# Patient Record
Sex: MALE | Race: WHITE | NOT HISPANIC OR LATINO | Employment: OTHER | URBAN - METROPOLITAN AREA
[De-identification: names, ages, dates, MRNs, and addresses within clinical notes are randomized per-mention and may not be internally consistent; named-entity substitution may affect disease eponyms.]

---

## 2023-11-15 ENCOUNTER — APPOINTMENT (OUTPATIENT)
Dept: RADIOLOGY | Facility: MEDICAL CENTER | Age: 43
End: 2023-11-15
Attending: NEUROLOGICAL SURGERY
Payer: COMMERCIAL

## 2023-11-15 ENCOUNTER — HOSPITAL ENCOUNTER (OUTPATIENT)
Facility: MEDICAL CENTER | Age: 43
End: 2023-11-15
Attending: NEUROLOGICAL SURGERY | Admitting: NEUROLOGICAL SURGERY
Payer: COMMERCIAL

## 2023-11-15 ENCOUNTER — HOSPITAL ENCOUNTER (OUTPATIENT)
Dept: RADIOLOGY | Facility: MEDICAL CENTER | Age: 43
End: 2023-11-15

## 2023-11-15 VITALS
HEIGHT: 68 IN | OXYGEN SATURATION: 95 % | DIASTOLIC BLOOD PRESSURE: 82 MMHG | WEIGHT: 232.81 LBS | HEART RATE: 71 BPM | RESPIRATION RATE: 16 BRPM | BODY MASS INDEX: 35.28 KG/M2 | TEMPERATURE: 98.1 F | SYSTOLIC BLOOD PRESSURE: 132 MMHG

## 2023-11-15 DIAGNOSIS — M54.40 LOW BACK PAIN WITH SCIATICA, SCIATICA LATERALITY UNSPECIFIED, UNSPECIFIED BACK PAIN LATERALITY, UNSPECIFIED CHRONICITY: ICD-10-CM

## 2023-11-15 LAB
ERYTHROCYTE [DISTWIDTH] IN BLOOD BY AUTOMATED COUNT: 39.7 FL (ref 35.9–50)
HCT VFR BLD AUTO: 43.2 % (ref 42–52)
HGB BLD-MCNC: 14.1 G/DL (ref 14–18)
INR PPP: 1.06 (ref 0.87–1.13)
MCH RBC QN AUTO: 28.3 PG (ref 27–33)
MCHC RBC AUTO-ENTMCNC: 32.6 G/DL (ref 32.3–36.5)
MCV RBC AUTO: 86.7 FL (ref 81.4–97.8)
PLATELET # BLD AUTO: 272 K/UL (ref 164–446)
PMV BLD AUTO: 8.8 FL (ref 9–12.9)
PROTHROMBIN TIME: 14 SEC (ref 12–14.6)
RBC # BLD AUTO: 4.98 M/UL (ref 4.7–6.1)
WBC # BLD AUTO: 11.2 K/UL (ref 4.8–10.8)

## 2023-11-15 PROCEDURE — 160002 HCHG RECOVERY MINUTES (STAT)

## 2023-11-15 PROCEDURE — 85027 COMPLETE CBC AUTOMATED: CPT

## 2023-11-15 PROCEDURE — 85610 PROTHROMBIN TIME: CPT

## 2023-11-15 PROCEDURE — 700117 HCHG RX CONTRAST REV CODE 255: Performed by: NEUROLOGICAL SURGERY

## 2023-11-15 PROCEDURE — 72110 X-RAY EXAM L-2 SPINE 4/>VWS: CPT

## 2023-11-15 PROCEDURE — 62284 INJECTION FOR MYELOGRAM: CPT

## 2023-11-15 PROCEDURE — 160047 HCHG PACU  - EA ADDL 30 MINS PHASE II

## 2023-11-15 PROCEDURE — 72132 CT LUMBAR SPINE W/DYE: CPT

## 2023-11-15 PROCEDURE — 160046 HCHG PACU - 1ST 60 MINS PHASE II

## 2023-11-15 RX ORDER — VITAMIN B COMPLEX
2000 TABLET ORAL DAILY
COMMUNITY

## 2023-11-15 RX ORDER — OMEPRAZOLE 20 MG/1
20 CAPSULE, DELAYED RELEASE ORAL DAILY
COMMUNITY

## 2023-11-15 RX ORDER — CETIRIZINE HYDROCHLORIDE 10 MG/1
10 TABLET ORAL DAILY
COMMUNITY

## 2023-11-15 RX ORDER — OXYCODONE HYDROCHLORIDE 5 MG/1
5 TABLET ORAL
Status: DISCONTINUED | OUTPATIENT
Start: 2023-11-15 | End: 2023-11-15 | Stop reason: HOSPADM

## 2023-11-15 RX ORDER — CHOLECALCIFEROL (VITAMIN D3) 125 MCG
1000 CAPSULE ORAL DAILY
COMMUNITY

## 2023-11-15 RX ORDER — OXYCODONE HYDROCHLORIDE 5 MG/1
10 TABLET ORAL
Status: DISCONTINUED | OUTPATIENT
Start: 2023-11-15 | End: 2023-11-15 | Stop reason: HOSPADM

## 2023-11-15 RX ORDER — NAPROXEN 500 MG/1
500 TABLET ORAL PRN
COMMUNITY

## 2023-11-15 RX ORDER — LOPERAMIDE HYDROCHLORIDE 2 MG/1
2 CAPSULE ORAL DAILY
COMMUNITY

## 2023-11-15 RX ORDER — ASCORBIC ACID 500 MG
500 TABLET ORAL DAILY
COMMUNITY

## 2023-11-15 RX ORDER — M-VIT,TX,IRON,MINS/CALC/FOLIC 27MG-0.4MG
1 TABLET ORAL DAILY
COMMUNITY

## 2023-11-15 RX ORDER — GLUCOSAMINE/CHONDR SU A SOD 750-600 MG
1 TABLET ORAL DAILY
COMMUNITY

## 2023-11-15 RX ORDER — IBUPROFEN 200 MG
200 TABLET ORAL EVERY 6 HOURS PRN
COMMUNITY

## 2023-11-15 RX ADMIN — IOHEXOL 10 ML: 180 INJECTION INTRAVENOUS at 12:00

## 2023-11-15 ASSESSMENT — PAIN DESCRIPTION - PAIN TYPE: TYPE: SURGICAL PAIN

## 2023-11-15 NOTE — DISCHARGE INSTRUCTIONS
Myelogram  A myelogram is an imaging study of the spinal cord and the places where nerves attach to the spinal cord (nerve roots). A dye (contrast material) is injected into the spine before the X-ray. This provides a clearer image for your health care provider to see.  You may need this study done if you have a spinal cord problem that cannot be diagnosed with other imaging studies, such as a CT scan or an MRI. You may also have this study to check your spine after surgery.  Tell a health care provider about:  Any allergies you have, especially to iodine.  All medicines you are taking, including vitamins, herbs, eye drops, creams, and over-the-counter medicines.  Any problems you or family members have had with anesthetic medicines or contrast material.  Any blood disorders you have.  Any surgeries you have had.  Any medical conditions you have or have had, including asthma.  Whether you are pregnant or may be pregnant.  What are the risks?  Generally, this is a safe procedure. However, problems may occur, including:  Infection.  Bleeding.  Allergic reaction to medicines or dyes.  Damage to your spinal cord or nerves.  Loss or leaking of spinal fluid. This can lead to headaches.  Damage to kidneys.  Seizures. This is rare.  What happens before the procedure?  Follow instructions from your health care provider about eating or drinking restrictions. You may be asked to drink more fluids.  Ask your health care provider about changing or stopping your regular medicines. This is especially important if you are taking diabetes medicines or blood thinners.  Plan to have someone take you home from the hospital or clinic.  If you will be going home right after the procedure, plan to have someone with you for 24 hours.  What happens during the procedure?  You will lie face down on a table.  Your health care provider will locate the best injection site on your spine. This is most often in the lower back.  The area of  injection will be washed with soap.  You will be given a medicine to numb the area (local anesthetic).  Your health care provider will insert a long needle into the space around your spinal cord (subarachnoid space).  A sample of spinal fluid may be taken and sent to the lab for testing.  The contrast material will be injected into the subarachnoid space.  The exam table may be tilted to help the contrast material flow up or down your spine.  The X-ray will take images of your spinal cord for your health care provider to examine.  A bandage (dressing) may be placed over the injection site.  The procedure may vary among health care providers and hospitals.  What can I expect after this procedure?  Your blood pressure, heart rate, breathing rate, and blood oxygen level may be monitored until you leave the hospital or clinic.  You may have:  Soreness on your injection site.  A mild headache.  You will be asked to lie down with your head raised (elevated). This reduces the risk of a headache.  It is up to you to get the results of your procedure. Ask your health care provider, or the department that is doing the procedure, when your results will be ready.  Follow these instructions at home:  Rest as told by your health care provider. Lie flat with your head slightly elevated to reduce the risk of a headache.  Do not bend, lift, or do hard work for 24-48 hours, or as told by your health care provider.  Take over-the-counter and prescription medicines only as told by your health care provider.  Take care of your dressing as told by your health care provider.  Drink enough fluid to keep your urine pale yellow.  Bathe or shower as told by your health care provider.  Contact a health care provider if:  You have a fever.  You have a headache that lasts longer than 24 hours.  You feel nauseous or vomit.  You have a stiff neck or numbness in your legs.  You are unable to urinate or have a bowel movement.  You develop a rash,  itching, or sneezing.  Get help right away if:  You have new symptoms or your symptoms get worse.  You have a seizure.  You have trouble breathing.  Summary  A myelogram is an imaging study of the spinal cord and the places where nerves attach to the spinal cord (nerve roots).  Before the procedure, follow instructions from your health care provider about changing or stopping your regular medicines, and eating and drinking restrictions.  After this procedure, you will be asked to lie down with your head raised (elevated). This reduces your risk of a headache.  Do not bend, lift, or do hard work for 24-48 hours, or as told by your health care provider.  Contact a health care provider if you have a stiff neck or numbness in your legs. Get help right away if symptoms get worse, or you have a seizure or trouble breathing.  This information is not intended to replace advice given to you by your health care provider. Make sure you discuss any questions you have with your health care provider.  Document Revised: 04/11/2023 Document Reviewed: 02/27/2020  Elsevier Patient Education © 2023 [employer]

## 2023-11-15 NOTE — OR NURSING
Patient called to find out about preadmit instructions for tomorrow surgery. He was very frustrated with me that I didn't have the check in time or that I couldn't describe the procedure and that someone said it was a surgery instead. He demanded that someone that know what is going on to call him. Did not allow me to go any other question or interactions pretending to tomorrow procedure. Patient stated that he doesn't care about eating or not eating the day of procedure.   I sent a team message to Nicolasa Aquino . This phone call was past 430 pm.

## 2023-11-15 NOTE — PROGRESS NOTES
1125 pt in phase 2, vss, band aide in place CDI, pt denies pain/nausea. Pt aware of bedrest until 1300.    1135 pt wife updated, will meet at [facility] 1300    1210 pt reports needs to void, but unable to laying down. Dr Hoover aware and ok for pt to sit up only to void.     1300 pt offers no complaints, vss, has all belongings, safe to dc home

## 2024-03-08 ENCOUNTER — APPOINTMENT (OUTPATIENT)
Dept: RADIOLOGY | Facility: MEDICAL CENTER | Age: 44
End: 2024-03-08
Attending: STUDENT IN AN ORGANIZED HEALTH CARE EDUCATION/TRAINING PROGRAM
Payer: COMMERCIAL

## 2024-03-11 ENCOUNTER — APPOINTMENT (OUTPATIENT)
Dept: RADIOLOGY | Facility: MEDICAL CENTER | Age: 44
End: 2024-03-11
Attending: STUDENT IN AN ORGANIZED HEALTH CARE EDUCATION/TRAINING PROGRAM
Payer: COMMERCIAL

## 2024-03-11 DIAGNOSIS — M54.50 LOW BACK PAIN, UNSPECIFIED BACK PAIN LATERALITY, UNSPECIFIED CHRONICITY, UNSPECIFIED WHETHER SCIATICA PRESENT: ICD-10-CM

## 2024-03-11 PROCEDURE — 72110 X-RAY EXAM L-2 SPINE 4/>VWS: CPT

## 2024-08-27 ENCOUNTER — APPOINTMENT (OUTPATIENT)
Dept: RADIOLOGY | Facility: MEDICAL CENTER | Age: 44
End: 2024-08-27
Attending: STUDENT IN AN ORGANIZED HEALTH CARE EDUCATION/TRAINING PROGRAM
Payer: COMMERCIAL

## 2024-08-27 DIAGNOSIS — M54.50 LUMBAR PAIN: ICD-10-CM

## 2024-08-27 PROCEDURE — 72110 X-RAY EXAM L-2 SPINE 4/>VWS: CPT

## 2025-03-31 ENCOUNTER — HOSPITAL ENCOUNTER (EMERGENCY)
Facility: MEDICAL CENTER | Age: 45
End: 2025-03-31
Attending: STUDENT IN AN ORGANIZED HEALTH CARE EDUCATION/TRAINING PROGRAM
Payer: OTHER GOVERNMENT

## 2025-03-31 ENCOUNTER — PHARMACY VISIT (OUTPATIENT)
Dept: PHARMACY | Facility: MEDICAL CENTER | Age: 45
End: 2025-03-31
Payer: COMMERCIAL

## 2025-03-31 VITALS
OXYGEN SATURATION: 98 % | DIASTOLIC BLOOD PRESSURE: 99 MMHG | HEIGHT: 68 IN | SYSTOLIC BLOOD PRESSURE: 160 MMHG | HEART RATE: 78 BPM | TEMPERATURE: 96.5 F | RESPIRATION RATE: 15 BRPM | BODY MASS INDEX: 38.02 KG/M2 | WEIGHT: 250.88 LBS

## 2025-03-31 DIAGNOSIS — M62.838 MUSCLE SPASMS OF LOWER EXTREMITY: ICD-10-CM

## 2025-03-31 DIAGNOSIS — S39.012A STRAIN OF LUMBAR REGION, INITIAL ENCOUNTER: ICD-10-CM

## 2025-03-31 PROCEDURE — RXMED WILLOW AMBULATORY MEDICATION CHARGE: Performed by: STUDENT IN AN ORGANIZED HEALTH CARE EDUCATION/TRAINING PROGRAM

## 2025-03-31 PROCEDURE — 700101 HCHG RX REV CODE 250: Performed by: STUDENT IN AN ORGANIZED HEALTH CARE EDUCATION/TRAINING PROGRAM

## 2025-03-31 PROCEDURE — 700102 HCHG RX REV CODE 250 W/ 637 OVERRIDE(OP): Performed by: STUDENT IN AN ORGANIZED HEALTH CARE EDUCATION/TRAINING PROGRAM

## 2025-03-31 PROCEDURE — 700111 HCHG RX REV CODE 636 W/ 250 OVERRIDE (IP): Mod: JZ | Performed by: STUDENT IN AN ORGANIZED HEALTH CARE EDUCATION/TRAINING PROGRAM

## 2025-03-31 PROCEDURE — 99283 EMERGENCY DEPT VISIT LOW MDM: CPT

## 2025-03-31 PROCEDURE — A9270 NON-COVERED ITEM OR SERVICE: HCPCS | Performed by: STUDENT IN AN ORGANIZED HEALTH CARE EDUCATION/TRAINING PROGRAM

## 2025-03-31 PROCEDURE — 96372 THER/PROPH/DIAG INJ SC/IM: CPT

## 2025-03-31 RX ORDER — ACETAMINOPHEN 500 MG
1000 TABLET ORAL ONCE
Status: COMPLETED | OUTPATIENT
Start: 2025-03-31 | End: 2025-03-31

## 2025-03-31 RX ORDER — DIAZEPAM 5 MG/1
5 TABLET ORAL ONCE
Status: COMPLETED | OUTPATIENT
Start: 2025-03-31 | End: 2025-03-31

## 2025-03-31 RX ORDER — KETOROLAC TROMETHAMINE 15 MG/ML
15 INJECTION, SOLUTION INTRAMUSCULAR; INTRAVENOUS ONCE
Status: COMPLETED | OUTPATIENT
Start: 2025-03-31 | End: 2025-03-31

## 2025-03-31 RX ORDER — LIDOCAINE 4 G/G
1 PATCH TOPICAL ONCE
Status: DISCONTINUED | OUTPATIENT
Start: 2025-03-31 | End: 2025-03-31 | Stop reason: HOSPADM

## 2025-03-31 RX ORDER — OXYCODONE HYDROCHLORIDE 5 MG/1
5 TABLET ORAL ONCE
Refills: 0 | Status: COMPLETED | OUTPATIENT
Start: 2025-03-31 | End: 2025-03-31

## 2025-03-31 RX ORDER — DIAZEPAM 5 MG/1
5 TABLET ORAL EVERY 6 HOURS PRN
Qty: 9 TABLET | Refills: 0 | Status: SHIPPED | OUTPATIENT
Start: 2025-03-31 | End: 2025-04-03

## 2025-03-31 RX ADMIN — ACETAMINOPHEN 1000 MG: 500 TABLET ORAL at 20:11

## 2025-03-31 RX ADMIN — DIAZEPAM 5 MG: 5 TABLET ORAL at 20:11

## 2025-03-31 RX ADMIN — OXYCODONE 5 MG: 5 TABLET ORAL at 20:11

## 2025-03-31 RX ADMIN — LIDOCAINE 1 PATCH: 4 PATCH TOPICAL at 20:12

## 2025-03-31 RX ADMIN — KETOROLAC TROMETHAMINE 15 MG: 15 INJECTION, SOLUTION INTRAMUSCULAR; INTRAVENOUS at 20:11

## 2025-03-31 ASSESSMENT — PAIN DESCRIPTION - PAIN TYPE: TYPE: ACUTE PAIN

## 2025-04-01 NOTE — ED PROVIDER NOTES
ED Provider Note    CHIEF COMPLAINT  Chief Complaint   Patient presents with    Back Pain     Pt was working on his RV this weekend and took naproxen and took it easy. Pt went to work today and was lifting something and has becoming more stiff as the day has gone on       EXTERNAL RECORDS REVIEWED  Outpatient Notes patient underwent a lumbar myelogram 11/15/2023.    HPI/ROS  LIMITATION TO HISTORY   Select: : None      Von Marshall is a 45 y.o. male who presents to the emergency department for evaluation of back pain.  The patient reports that he was working on his RV this weekend, bending forward often and lifting things and noted what he describes as a twinge in his right low back.  He states that Sunday morning he awoke and felt very stiff but that the symptoms gradually improved throughout the day.  Today he went to work where he works as a , bent forward to lift something and rolled over and then felt a sudden onset severe stiffening of the right low back, right glued and right quad.  He states that the symptoms have been progressive and worsening since they started.  He states that he feels best when he stands up, worse when sitting or laying.  He reports some tingling in his left hand, tingling in his feet which is somewhat chronic.  He denies any new numbness or weakness, urinary or stool incontinence or retention, fevers or chills.  He notes prior history of lumbar discectomy and laminectomy in 2023.    PAST MEDICAL HISTORY   has a past medical history of Brain swelling (HCC), IBS (irritable bowel syndrome), Insomnia, and PTSD (post-traumatic stress disorder).    SURGICAL HISTORY   has a past surgical history that includes wrist arthroscopy (Right); shoulder surgery (Left); cervical disk and fusion anterior (2022); septoplasty; and lumbar laminectomy diskectomy.    FAMILY HISTORY  Family History   Problem Relation Age of Onset    Other Neg Hx        SOCIAL HISTORY  Social History     Tobacco  "Use    Smoking status: Former     Current packs/day: 0.00     Types: Cigarettes     Quit date: 2013     Years since quittin.6    Smokeless tobacco: Current     Types: Chew   Vaping Use    Vaping status: Never Used   Substance and Sexual Activity    Alcohol use: Yes     Comment: rare    Drug use: No    Sexual activity: Not on file       CURRENT MEDICATIONS  Home Medications       Reviewed by Janny Wasserman R.N. (Registered Nurse) on 25 at 1912  Med List Status: Partial     Medication Last Dose Status   ascorbic acid (VITAMIN C) 500 MG tablet  Active   cetirizine (ZYRTEC) 10 MG Tab  Active   cyanocobalamin (VITAMIN B-12) 500 MCG Tab  Active   Diazepam (VALIUM PO)  Active   Ginkgo Biloba 120 MG Tab  Active   ibuprofen (MOTRIN) 200 MG Tab  Active   loperamide (IMODIUM) 2 MG Cap  Active   naproxen (NAPROSYN) 500 MG Tab  Active   omeprazole (PRILOSEC) 20 MG delayed-release capsule  Active   therapeutic multivitamin-minerals (THERAGRAN-M) Tab  Active   Turmeric 450 MG Cap  Active   vitamin D3 (CHOLECALCIFEROL) 1000 Unit (25 mcg) Tab  Active                    ALLERGIES  Allergies   Allergen Reactions    Pcn [Penicillins]      Reaction as a child       PHYSICAL EXAM  VITAL SIGNS: BP (!) 175/110   Pulse 84   Temp 35.8 °C (96.5 °F) (Temporal)   Resp 16   Ht 1.727 m (5' 8\")   Wt 114 kg (250 lb 14.1 oz)   SpO2 97%   BMI 38.15 kg/m²    Constitutional: No acute distress, uncomfortable appearing standing up against a wall  HEENT: Atraumatic, normocephalic, moist mucous membrane  Neck: Supple, no midline tenderness  Cardiovascular: Regular rate and rhythm, no murmur, rub or gallop, 2+ pulses peripherally x4  Thorax & Lungs: No respiratory distress  GI: Soft, non-distended, non-tender, no masses  Skin: Warm, dry, no acute rash or lesion  Musculoskeletal: No midline cervical thoracic or lumbar spinal tenderness or bony step-offs.  There is reproducible lumbar muscular tenderness and gluteal tenderness on the " right.  Full range of motion of all major joints with no pain.  Neurologic: A&Ox3, at baseline mentation, 5 out of 5 strength with EHL activation, dorsi and plantarflexion at the ankles, flexion and extension at the knees and hips.  Normal distal sensation in all nerve distributions of bilateral feet.  Normal patellar reflexes bilaterally.  Ambulates with a steady but antalgic gait.  Psychiatric: Appropriate affect for situation at this time      COURSE & MEDICAL DECISION MAKING    ASSESSMENT, COURSE AND PLAN  Care Narrative:     Patient with a history of chronic back pain status post cervical and lumbar laminectomy discectomy 2 years ago presents to the ER for evaluation of right-sided low back pain radiating into his gluteal area and thigh.  This was in the setting of increased activity with bending and lifting over the last few days.  Examination suggestive of significant muscular strain in the lumbar region and gluteal region.  He has no red flag symptoms or signs on examination concerning for infectious etiology of his pain including spinal epidural abscess, vertebral osteomyelitis or discitis nor spinal cord injury or compression.  No mechanism of injury concerning for acute vertebral fracture.  No radiculopathy at this time.  Discussed with patient I suspect his symptoms are caused by muscular strain.  I do not feel any diagnostic imaging necessary at this point. Recommended multimodal symptom control with oxycodone, ketorolac, diazepam and acetaminophen and a lidocaine patch.  Following treatment with this he is feeling much better.  He is already failed treatment with Robaxin, Flexeril, Tylenol and NSAIDs in the past.  I feel comfortable prescribing a short course of oral Valium given the degree of his muscle spasm but discussed appropriate and safe utilization of this medication and otherwise encouraged continued use of Tylenol and ibuprofen, lidocaine patches and follow-up with physical therapy and his  primary care doctor.  He is comfortable with this plan.  Return precautions discussed all questions answered    Benzodiazepine Script: In prescribing controlled substances to this patient, I certify that I have obtained and reviewed the medical history of Von Marshall. I have also made a good andres effort to obtain applicable records from other providers who have treated the patient and records did not demonstrate any increased risk of substance abuse that would prevent me from prescribing controlled substances.     I have conducted a physical exam and documented it. I have reviewed Mr. Marshall’s prescription history as maintained by the [place] Prescription Monitoring Program.     I have assessed the patient’s risk for abuse, dependency, and addiction using the validated Opioid Risk Tool available at https://www.[employer].com/jzkttp-cytf-gmxv-ort-narcotic-abuse.     Given the above, I believe the benefits of controlled substance therapy outweigh the risks. The reasons for prescribing controlled substances include in my professional opinion, controlled substances are the only reasonable choice for this patient because of failure of nonbenzodiazepine muscle relaxants and severity of symptoms . Accordingly, I have discussed the risk and benefits, treatment plan, and alternative therapies with the patient.           ADDITIONAL PROBLEMS MANAGED  None    DISPOSITION AND DISCUSSIONS  I have discussed management of the patient with the following physicians and JENNIFER's: None    Discussion of management with other [facility] or appropriate source(s): None     Escalation of care considered, and ultimately not performed:diagnostic imaging    Decision tools and prescription drugs considered including, but not limited to: Pain Medications Valium muscle relaxer .    FINAL DIAGNOSIS  1. Strain of lumbar region, initial encounter    2. Muscle spasms of lower extremity         Electronically signed by: Artis Hernandez M.D., 3/31/2025  7:50 PM

## 2025-04-01 NOTE — DISCHARGE INSTRUCTIONS
As we discussed I would recommend establishing care with physical therapy and continuing with chiropractics if it is helping you.  You should take Tylenol 1000 mg, ibuprofen 600 mg every 6 hours or naproxen.  Twice daily.  You can take Valium by mouth every 6 hours.  Follow-up with your spine team as well.

## 2025-04-01 NOTE — ED TRIAGE NOTES
"Chief Complaint   Patient presents with    Back Pain     Pt was working on his RV this weekend and took naproxen and took it easy. Pt went to work today and was lifting something and has becoming more stiff as the day has gone on           Pt ambulatory into triage for above. Pt does have artificial disc to L5-S1. Pt denies hearing a pop and denies any bowel or bladder changes. Pt states the pain is radiating down his R buttocks and R quad as well and that the pain is throbbing up his spine as well. Pt aox4 gcs 15              BP (!) 175/110   Pulse 84   Temp 35.8 °C (96.5 °F) (Temporal)   Resp 16   Ht 1.727 m (5' 8\")   Wt 114 kg (250 lb 14.1 oz)   SpO2 97%   BMI 38.15 kg/m²     "

## 2025-04-02 ENCOUNTER — APPOINTMENT (OUTPATIENT)
Dept: RADIOLOGY | Facility: MEDICAL CENTER | Age: 45
End: 2025-04-02
Attending: EMERGENCY MEDICINE
Payer: OTHER GOVERNMENT

## 2025-04-02 ENCOUNTER — PHARMACY VISIT (OUTPATIENT)
Dept: PHARMACY | Facility: MEDICAL CENTER | Age: 45
End: 2025-04-02
Payer: COMMERCIAL

## 2025-04-02 ENCOUNTER — HOSPITAL ENCOUNTER (EMERGENCY)
Facility: MEDICAL CENTER | Age: 45
End: 2025-04-02
Attending: EMERGENCY MEDICINE
Payer: OTHER GOVERNMENT

## 2025-04-02 VITALS
WEIGHT: 242.51 LBS | TEMPERATURE: 97.6 F | BODY MASS INDEX: 36.75 KG/M2 | SYSTOLIC BLOOD PRESSURE: 132 MMHG | DIASTOLIC BLOOD PRESSURE: 86 MMHG | HEART RATE: 73 BPM | HEIGHT: 68 IN | RESPIRATION RATE: 16 BRPM | OXYGEN SATURATION: 91 %

## 2025-04-02 DIAGNOSIS — S39.012A STRAIN OF LUMBAR REGION, INITIAL ENCOUNTER: ICD-10-CM

## 2025-04-02 LAB
ALBUMIN SERPL BCP-MCNC: 3.9 G/DL (ref 3.2–4.9)
ALBUMIN/GLOB SERPL: 1.4 G/DL
ALP SERPL-CCNC: 78 U/L (ref 30–99)
ALT SERPL-CCNC: 23 U/L (ref 2–50)
ANION GAP SERPL CALC-SCNC: 11 MMOL/L (ref 7–16)
AST SERPL-CCNC: 26 U/L (ref 12–45)
BASOPHILS # BLD AUTO: 0.4 % (ref 0–1.8)
BASOPHILS # BLD: 0.04 K/UL (ref 0–0.12)
BILIRUB SERPL-MCNC: 0.4 MG/DL (ref 0.1–1.5)
BUN SERPL-MCNC: 17 MG/DL (ref 8–22)
CALCIUM ALBUM COR SERPL-MCNC: 9.2 MG/DL (ref 8.5–10.5)
CALCIUM SERPL-MCNC: 9.1 MG/DL (ref 8.5–10.5)
CHLORIDE SERPL-SCNC: 104 MMOL/L (ref 96–112)
CO2 SERPL-SCNC: 24 MMOL/L (ref 20–33)
CREAT SERPL-MCNC: 0.92 MG/DL (ref 0.5–1.4)
CRP SERPL HS-MCNC: 0.53 MG/DL (ref 0–0.75)
EOSINOPHIL # BLD AUTO: 0.17 K/UL (ref 0–0.51)
EOSINOPHIL NFR BLD: 1.9 % (ref 0–6.9)
ERYTHROCYTE [DISTWIDTH] IN BLOOD BY AUTOMATED COUNT: 36.7 FL (ref 35.9–50)
ERYTHROCYTE [SEDIMENTATION RATE] IN BLOOD BY WESTERGREN METHOD: 2 MM/HOUR (ref 0–20)
GFR SERPLBLD CREATININE-BSD FMLA CKD-EPI: 105 ML/MIN/1.73 M 2
GLOBULIN SER CALC-MCNC: 2.7 G/DL (ref 1.9–3.5)
GLUCOSE SERPL-MCNC: 99 MG/DL (ref 65–99)
HCT VFR BLD AUTO: 43.7 % (ref 42–52)
HGB BLD-MCNC: 15.5 G/DL (ref 14–18)
IMM GRANULOCYTES # BLD AUTO: 0.02 K/UL (ref 0–0.11)
IMM GRANULOCYTES NFR BLD AUTO: 0.2 % (ref 0–0.9)
LYMPHOCYTES # BLD AUTO: 3.79 K/UL (ref 1–4.8)
LYMPHOCYTES NFR BLD: 42.3 % (ref 22–41)
MCH RBC QN AUTO: 28.5 PG (ref 27–33)
MCHC RBC AUTO-ENTMCNC: 35.5 G/DL (ref 32.3–36.5)
MCV RBC AUTO: 80.3 FL (ref 81.4–97.8)
MONOCYTES # BLD AUTO: 0.68 K/UL (ref 0–0.85)
MONOCYTES NFR BLD AUTO: 7.6 % (ref 0–13.4)
NEUTROPHILS # BLD AUTO: 4.27 K/UL (ref 1.82–7.42)
NEUTROPHILS NFR BLD: 47.6 % (ref 44–72)
NRBC # BLD AUTO: 0 K/UL
NRBC BLD-RTO: 0 /100 WBC (ref 0–0.2)
PLATELET # BLD AUTO: 272 K/UL (ref 164–446)
PMV BLD AUTO: 9.5 FL (ref 9–12.9)
POTASSIUM SERPL-SCNC: 4 MMOL/L (ref 3.6–5.5)
PROT SERPL-MCNC: 6.6 G/DL (ref 6–8.2)
RBC # BLD AUTO: 5.44 M/UL (ref 4.7–6.1)
SODIUM SERPL-SCNC: 139 MMOL/L (ref 135–145)
WBC # BLD AUTO: 9 K/UL (ref 4.8–10.8)

## 2025-04-02 PROCEDURE — 85652 RBC SED RATE AUTOMATED: CPT

## 2025-04-02 PROCEDURE — 72148 MRI LUMBAR SPINE W/O DYE: CPT

## 2025-04-02 PROCEDURE — 96374 THER/PROPH/DIAG INJ IV PUSH: CPT

## 2025-04-02 PROCEDURE — 99284 EMERGENCY DEPT VISIT MOD MDM: CPT

## 2025-04-02 PROCEDURE — 80053 COMPREHEN METABOLIC PANEL: CPT

## 2025-04-02 PROCEDURE — 96375 TX/PRO/DX INJ NEW DRUG ADDON: CPT

## 2025-04-02 PROCEDURE — 36415 COLL VENOUS BLD VENIPUNCTURE: CPT

## 2025-04-02 PROCEDURE — 700101 HCHG RX REV CODE 250: Performed by: EMERGENCY MEDICINE

## 2025-04-02 PROCEDURE — 96376 TX/PRO/DX INJ SAME DRUG ADON: CPT

## 2025-04-02 PROCEDURE — 85025 COMPLETE CBC W/AUTO DIFF WBC: CPT

## 2025-04-02 PROCEDURE — 700111 HCHG RX REV CODE 636 W/ 250 OVERRIDE (IP): Performed by: EMERGENCY MEDICINE

## 2025-04-02 PROCEDURE — RXMED WILLOW AMBULATORY MEDICATION CHARGE: Performed by: EMERGENCY MEDICINE

## 2025-04-02 PROCEDURE — 86140 C-REACTIVE PROTEIN: CPT

## 2025-04-02 RX ORDER — MORPHINE SULFATE 4 MG/ML
4 INJECTION INTRAVENOUS ONCE
Status: COMPLETED | OUTPATIENT
Start: 2025-04-02 | End: 2025-04-02

## 2025-04-02 RX ORDER — CYCLOBENZAPRINE HCL 10 MG
10 TABLET ORAL 3 TIMES DAILY PRN
Qty: 30 TABLET | Refills: 0 | Status: SHIPPED | OUTPATIENT
Start: 2025-04-02

## 2025-04-02 RX ORDER — OXYCODONE AND ACETAMINOPHEN 5; 325 MG/1; MG/1
1 TABLET ORAL EVERY 4 HOURS PRN
Qty: 15 TABLET | Refills: 0 | Status: SHIPPED | OUTPATIENT
Start: 2025-04-02 | End: 2025-04-05

## 2025-04-02 RX ORDER — LIDOCAINE 4 G/G
1 PATCH TOPICAL EVERY 24 HOURS
Status: DISCONTINUED | OUTPATIENT
Start: 2025-04-02 | End: 2025-04-02 | Stop reason: HOSPADM

## 2025-04-02 RX ORDER — ONDANSETRON 2 MG/ML
4 INJECTION INTRAMUSCULAR; INTRAVENOUS ONCE
Status: COMPLETED | OUTPATIENT
Start: 2025-04-02 | End: 2025-04-02

## 2025-04-02 RX ORDER — DIAZEPAM 10 MG/2ML
5 INJECTION, SOLUTION INTRAMUSCULAR; INTRAVENOUS ONCE
Status: COMPLETED | OUTPATIENT
Start: 2025-04-02 | End: 2025-04-02

## 2025-04-02 RX ORDER — METHYLPREDNISOLONE SODIUM SUCCINATE 125 MG/2ML
125 INJECTION, POWDER, LYOPHILIZED, FOR SOLUTION INTRAMUSCULAR; INTRAVENOUS ONCE
Status: COMPLETED | OUTPATIENT
Start: 2025-04-02 | End: 2025-04-02

## 2025-04-02 RX ORDER — METHYLPREDNISOLONE 4 MG/1
TABLET ORAL
Qty: 21 EACH | Refills: 0 | Status: SHIPPED | OUTPATIENT
Start: 2025-04-02

## 2025-04-02 RX ADMIN — MORPHINE SULFATE 4 MG: 4 INJECTION INTRAVENOUS at 14:01

## 2025-04-02 RX ADMIN — METHYLPREDNISOLONE SODIUM SUCCINATE 125 MG: 125 INJECTION, POWDER, FOR SOLUTION INTRAMUSCULAR; INTRAVENOUS at 16:26

## 2025-04-02 RX ADMIN — MORPHINE SULFATE 4 MG: 4 INJECTION INTRAVENOUS at 16:09

## 2025-04-02 RX ADMIN — LIDOCAINE 1 PATCH: 4 PATCH TOPICAL at 16:26

## 2025-04-02 RX ADMIN — ONDANSETRON 4 MG: 2 INJECTION INTRAMUSCULAR; INTRAVENOUS at 14:02

## 2025-04-02 RX ADMIN — DIAZEPAM 5 MG: 10 INJECTION, SOLUTION INTRAMUSCULAR; INTRAVENOUS at 14:02

## 2025-04-02 ASSESSMENT — PAIN DESCRIPTION - PAIN TYPE
TYPE: ACUTE PAIN
TYPE: ACUTE PAIN;CHRONIC PAIN
TYPE: ACUTE PAIN
TYPE: ACUTE PAIN

## 2025-04-02 NOTE — ED TRIAGE NOTES
"Chief Complaint   Patient presents with    Low Back Pain     Over weekend excessive exertion and states \"tweaked back\", twisted back on Monday and resulting stiffness, at ER Monday night, Rx valium on dc, back pain sustained since, radiating pain R leg with decreased ROM, BL feet tingling, prior back brace on at triage, hx back surgeries/slipped discs         Ambulated to triage for above complaint.     Past Medical History:   Diagnosis Date    Brain swelling (HCC)     arnold- chiari malformation    IBS (irritable bowel syndrome)     Insomnia     PTSD (post-traumatic stress disorder)        Pt educated of triage process and possible wait times. Pt informed to contact staff if status changes or with any developing concerns, pt returned to lobby.     BP (!) 137/102   Pulse 90   Temp 36.3 °C (97.4 °F) (Temporal)   Resp 16   Ht 1.727 m (5' 8\")   Wt 110 kg (242 lb 8.1 oz)   SpO2 98%   BMI 36.87 kg/m²    "

## 2025-04-02 NOTE — ED PROVIDER NOTES
"                                                                                                                                                                                                                                   ED Provider Note   CC:  Chief Complaint   Patient presents with    Low Back Pain     Over weekend excessive exertion and states \"tweaked back\", twisted back on Monday and resulting stiffness, at ER Monday night, Rx valium on dc, back pain sustained since, radiating pain R leg with decreased ROM, BL feet tingling, prior back brace on at triage, hx back surgeries/slipped discs             EXTERNAL RECORDS REVIEWED  Outpatient Notes patient was seen at the [facility] for chronic back pain GERD headache PTSD anxiety he is on baclofen diazepam diclofenac gel and narcotic pain medication and lidocaine patches    HPI/ROS  LIMITATION TO HISTORY   Select: : None  OUTSIDE HISTORIAN(S):  none    History of present illness:    Von Marshall is a 45 y.o. male who presents complaining of progressively worsening back pain in his low back worse on the right side since Saturday when he tweaked his back.  He twisted it again on Monday and has had stiffness he was in the emergency department Monday night and prescribed Valium and anti-inflammatories.  He states that ever since he has been home his back pain has been worsening and is now rating down his right leg.  He cannot walk and has weakness on his right leg and bilateral tingling in his feet.  He has a history of disc disease and back pain with multiple lumbar surgeries the last 1 was a disc replacement in 2023 with Dr. Chavez.  He denies any fevers or chills no recent antibiotics no recent steroid use.  He denies any loss of bowel or bladder control.  He states he cannot walk secondary to weakness.  The patient states that normally he walks without a cane or walker and he drives monster trucks and has never had pain this bad even before his " previous back surgeries.  No bowel or Bladder incontinence or retention  No saddle anesthesia  Positive right lower extremity weakness  No fever  No injection drug abuse  No immunocompromise  No corticosteroid use  No history of recent bacteremia  No abdominal pain  No known hx AAA    Past Medical History  Past Medical History:   Diagnosis Date    Brain swelling (HCC)     arnold- chiari malformation    IBS (irritable bowel syndrome)     Insomnia     PTSD (post-traumatic stress disorder)         Surgical History  Past Surgical History:   Procedure Laterality Date    CERVICAL DISK AND FUSION ANTERIOR      Dr. Chavez    LUMBAR LAMINECTOMY DISKECTOMY      L5-S1 4x    SEPTOPLASTY      SHOULDER SURGERY Left     WRIST ARTHROSCOPY Right         Family History  Family History   Problem Relation Age of Onset    Other Neg Hx         Social History  Social History     Socioeconomic History    Marital status: Single     Spouse name: Not on file    Number of children: Not on file    Years of education: Not on file    Highest education level: Not on file   Occupational History    Not on file   Tobacco Use    Smoking status: Former     Current packs/day: 0.00     Types: Cigarettes     Quit date: 2013     Years since quittin.6    Smokeless tobacco: Current     Types: Chew   Vaping Use    Vaping status: Never Used   Substance and Sexual Activity    Alcohol use: Yes     Comment: rare    Drug use: No    Sexual activity: Not on file   Other Topics Concern    Not on file   Social History Narrative    Not on file     Social Drivers of Health     Financial Resource Strain: Not on file   Food Insecurity: Not on file   Transportation Needs: Not on file   Physical Activity: Not on file   Stress: Not on file   Social Connections: Not on file   Intimate Partner Violence: Not on file   Housing Stability: Not on file        Current Medications    Current Facility-Administered Medications:     lidocaine    Current Outpatient  "Medications:     methylPREDNISolone, Use as directed, Taking    oxyCODONE-acetaminophen, 1 Tablet, Oral, Q4HRS PRN, Taking As Needed    cyclobenzaprine, 10 mg, Oral, TID PRN, Taking As Needed    diazePAM, 5 mg, Oral, Q6HRS PRN    ascorbic acid, 500 mg, Oral, DAILY    vitamin D3, 2,000 Units, Oral, DAILY    Turmeric, 1 Capsule, Oral, DAILY    cyanocobalamin, 1,000 mcg, Oral, DAILY    Ginkgo Biloba, 1 Tablet, Oral, DAILY    omeprazole, 20 mg, Oral, DAILY    loperamide, 2 mg, Oral, DAILY    naproxen, 500 mg, Oral, PRN    cetirizine, 10 mg, Oral, DAILY    therapeutic multivitamin-minerals, 1 Tablet, Oral, DAILY    ibuprofen, 200 mg, Oral, Q6HRS PRN    Diazepam (VALIUM PO), 5 mg, Oral, PRN     Allergies  Pcn [penicillins]       Physical exam  /86   Pulse 73   Temp 36.4 °C (97.6 °F) (Temporal)   Resp 16   Ht 1.727 m (5' 8\")   Wt 110 kg (242 lb 8.1 oz)   SpO2 91%   BMI 36.87 kg/m²    Gen: Alert and awake, moderate distress  Back: Tenderness to his LS spine to palpation and paraspinous muscle spasm and pain  Abd: Soft nontender no pulsatile masses  Neuro: Patient has 2 out of 5 strength to the right lower extremity and 3 out of 5 strength to the left sensation is normal DTRs are decreased on the right   extremities: No cyanosis clubbing or edema  Skin: Warm and dry no erythema contusions or abrasions no rash      EKG/Labs  Results for orders placed or performed during the hospital encounter of 04/02/25   CBC WITH DIFFERENTIAL    Collection Time: 04/02/25  1:55 PM   Result Value Ref Range    WBC 9.0 4.8 - 10.8 K/uL    RBC 5.44 4.70 - 6.10 M/uL    Hemoglobin 15.5 14.0 - 18.0 g/dL    Hematocrit 43.7 42.0 - 52.0 %    MCV 80.3 (L) 81.4 - 97.8 fL    MCH 28.5 27.0 - 33.0 pg    MCHC 35.5 32.3 - 36.5 g/dL    RDW 36.7 35.9 - 50.0 fL    Platelet Count 272 164 - 446 K/uL    MPV 9.5 9.0 - 12.9 fL    Neutrophils-Polys 47.60 44.00 - 72.00 %    Lymphocytes 42.30 (H) 22.00 - 41.00 %    Monocytes 7.60 0.00 - 13.40 %    " Eosinophils 1.90 0.00 - 6.90 %    Basophils 0.40 0.00 - 1.80 %    Immature Granulocytes 0.20 0.00 - 0.90 %    Nucleated RBC 0.00 0.00 - 0.20 /100 WBC    Neutrophils (Absolute) 4.27 1.82 - 7.42 K/uL    Lymphs (Absolute) 3.79 1.00 - 4.80 K/uL    Monos (Absolute) 0.68 0.00 - 0.85 K/uL    Eos (Absolute) 0.17 0.00 - 0.51 K/uL    Baso (Absolute) 0.04 0.00 - 0.12 K/uL    Immature Granulocytes (abs) 0.02 0.00 - 0.11 K/uL    NRBC (Absolute) 0.00 K/uL   Comp Metabolic Panel    Collection Time: 04/02/25  1:55 PM   Result Value Ref Range    Sodium 139 135 - 145 mmol/L    Potassium 4.0 3.6 - 5.5 mmol/L    Chloride 104 96 - 112 mmol/L    Co2 24 20 - 33 mmol/L    Anion Gap 11.0 7.0 - 16.0    Glucose 99 65 - 99 mg/dL    Bun 17 8 - 22 mg/dL    Creatinine 0.92 0.50 - 1.40 mg/dL    Calcium 9.1 8.5 - 10.5 mg/dL    Correct Calcium 9.2 8.5 - 10.5 mg/dL    AST(SGOT) 26 12 - 45 U/L    ALT(SGPT) 23 2 - 50 U/L    Alkaline Phosphatase 78 30 - 99 U/L    Total Bilirubin 0.4 0.1 - 1.5 mg/dL    Albumin 3.9 3.2 - 4.9 g/dL    Total Protein 6.6 6.0 - 8.2 g/dL    Globulin 2.7 1.9 - 3.5 g/dL    A-G Ratio 1.4 g/dL   CRP QUANTITIVE (NON-CARDIAC)    Collection Time: 04/02/25  1:55 PM   Result Value Ref Range    Stat C-Reactive Protein 0.53 0.00 - 0.75 mg/dL   ESTIMATED GFR    Collection Time: 04/02/25  1:55 PM   Result Value Ref Range    GFR (CKD-EPI) 105 >60 mL/min/1.73 m 2   Sed Rate    Collection Time: 04/02/25  3:07 PM   Result Value Ref Range    Sed Rate Westergren 2 0 - 20 mm/hour         RADIOLOGY/PROCEDURES       Radiologist interpretation:  MR-LUMBAR SPINE-W/O   Final Result         Postoperative changes at L5-S1 with a disc implant in place. No evidence of significant canal stenosis or foraminal narrowing in the lumbar spine.             Course and Medical Decision Making            Assessment, Course and Plan:   ED course: The patient has a history of back injury and previous surgeries and has had some extra strain on his back Saturday and  Monday and has had progressively worsening pain that now is resulted in pain in his right leg and weakness to his right leg.  The pain is so bad he has inability to walk.  He denies any numbness in his groin fevers or chills.  On physical exam he has weakness in his right lower extremity with foot drop he is neurovascularly intact DTRs are decreased on the right.      Narcotics Script: In prescribing controlled substances to this patient, I certify that I have obtained and reviewed the medical history of Von Marshall. I have also made a good andres effort to obtain applicable records from other providers who have treated the patient and records demonstrating the following:  a Score  of 328 due to diazepam prescription but he has not had any narcotics prescription since 2023 .     I have conducted a physical exam and documented it. I have reviewed Mr. Marshall’s prescription history as maintained by the [place] Prescription Monitoring Program.     I have assessed the patient’s risk for abuse, dependency, and addiction using the validated Opioid Risk Tool available at https://www.mdcalc.com/sxmkod-ihbz-ruck-ort-narcotic-abuse.     Given the above, I believe the benefits of controlled substance therapy outweigh the risks. The reasons for prescribing controlled substances include non-narcotic, oral analgesic alternatives have been inadequate for pain control. Accordingly, I have discussed the risk and benefits, treatment plan, and alternative therapies with the patient.      DDX:   Cauda equina syndrome, emergency spinal cord impingement,       Additional Problems Managed      Disposition and Discussions    An IV was started and labs were drawn I gave the patient morphine and Zofran for pain as well as Valium for spasm.  I ordered an MRI of his lumbar spine because of his new right leg weakness and inability to walk.  The patient's white count is normal at 9 hemoglobin 15.5 with a normal differential platelet count  normal at 272 sed rate is normal at 2 comprehensive metabolic panel is normal glucose normal electrolytes normal kidney function and normal liver function test.  CRP is 0.53.  MRI of the lumbar spine without shows postoperative changes L5-S1 with the disc implant in place no evidence of significant canal stenosis or foraminal narrowing in the lumbar spine.    I reassured the patient that he does not need surgery and does not have disc disease so bad that he would need any sort of intervention or hospitalization.  I gave him another dose of morphine as well as a lidocaine patch and some Solu-Medrol.  I have referred him to outpatient block clinic with  and advised him to go tomorrow for a block.  I also advised him to follow-up with the [facility] for a referral for physical therapy.  I will discharge patient on a small amount of pain medication as well as Flexeril.  He understands his results and is fine with being discharged at this time.    I have discussed management of the patient with the following physicians and JENNIFER's:  none    Discussion of management with other QHP or appropriate source(s): None     Escalation of care considered, and ultimately not performed:acute inpatient care management, however at this time, the patient is most appropriate for outpatient management    Barriers to care at this time, including but not limited to:  none.     Decision tools and prescription drugs considered including, but not limited to: Pain Medications morphine, solumedrol .          The patient will return for new or worsening symptoms and is stable at the time of discharge.    The patient is referred to a primary physician for blood pressure management, diabetic screening, and for all other preventative health concerns.        DISPOSITION:  Patient will be discharged home in stable condition.    FOLLOW UP:  Micah De Santiago M.D.  [address]  [place] [id number] [phone]    In 1 day  to  establish care for block    [facility]  [address] [place] [address]  [phone]  Call in 1 day  for recheck and for PT referral      OUTPATIENT MEDICATIONS:  New Prescriptions    CYCLOBENZAPRINE (FLEXERIL) 10 MG TAB    Take 1 Tablet by mouth 3 times a day as needed for Muscle Spasms or Moderate Pain.    METHYLPREDNISOLONE (MEDROL DOSEPAK) 4 MG TABLET THERAPY PACK    Use as directed    OXYCODONE-ACETAMINOPHEN (PERCOCET) 5-325 MG TAB    Take 1 Tablet by mouth every four hours as needed (pain) for up to 3 days.       Diagnosis  1. Strain of lumbar region, initial encounter             Electronically signed by: Lashawn Tabares M.D., 4/2/2025 1:27 PM

## 2025-04-03 NOTE — ED NOTES
"Pt discharged home with spouse. IV discontinued and gauze placed, pt in possession of belongings. Pt provided discharge education and information pertaining to medications and follow up appointments. Pt received copy of discharge instructions and verbalized understanding. /86   Pulse 73   Temp 36.4 °C (97.6 °F) (Temporal)   Resp 16   Ht 1.727 m (5' 8\")   Wt 110 kg (242 lb 8.1 oz)   SpO2 91%   BMI 36.87 kg/m²     "